# Patient Record
Sex: FEMALE | Race: WHITE | NOT HISPANIC OR LATINO | URBAN - NONMETROPOLITAN AREA
[De-identification: names, ages, dates, MRNs, and addresses within clinical notes are randomized per-mention and may not be internally consistent; named-entity substitution may affect disease eponyms.]

---

## 2019-04-04 ENCOUNTER — IMPORTED ENCOUNTER (OUTPATIENT)
Dept: URBAN - NONMETROPOLITAN AREA CLINIC 1 | Facility: CLINIC | Age: 69
End: 2019-04-04

## 2019-04-04 PROBLEM — H25.813: Noted: 2019-04-04

## 2019-04-04 PROBLEM — H52.4: Noted: 2019-04-04

## 2019-04-04 PROBLEM — H52.03: Noted: 2019-04-04

## 2019-04-04 PROCEDURE — 92015 DETERMINE REFRACTIVE STATE: CPT

## 2019-04-04 PROCEDURE — 99203 OFFICE O/P NEW LOW 30 MIN: CPT

## 2019-04-04 NOTE — PATIENT DISCUSSION
Cataract OU-Reviewed symptoms of advancing cataract growth such as glare and halos and decreased vision.-Continue to monitor for now. Pt will notify us if any new symptoms develop. Hyperopia-Discussed diagnosis with patient. Presbyopia-Discussed diagnosis with patient. Updated spec Rx given. Recommend lens that will provide comfort as well as protect safety and health of eyes. RTC 1 Yr CEE

## 2022-04-09 ASSESSMENT — VISUAL ACUITY
OD_CC: 20/25+
OS_CC: 20/30

## 2022-04-09 ASSESSMENT — TONOMETRY
OS_IOP_MMHG: 13
OD_IOP_MMHG: 13

## 2022-05-17 ENCOUNTER — ESTABLISHED PATIENT (OUTPATIENT)
Dept: RURAL CLINIC 2 | Facility: CLINIC | Age: 72
End: 2022-05-17

## 2022-05-17 DIAGNOSIS — H25.813: ICD-10-CM

## 2022-05-17 DIAGNOSIS — H52.4: ICD-10-CM

## 2022-05-17 DIAGNOSIS — H35.372: ICD-10-CM

## 2022-05-17 DIAGNOSIS — H43.812: ICD-10-CM

## 2022-05-17 PROCEDURE — 92134 CPTRZ OPH DX IMG PST SGM RTA: CPT

## 2022-05-17 PROCEDURE — 99204 OFFICE O/P NEW MOD 45 MIN: CPT

## 2022-05-17 PROCEDURE — 92015 DETERMINE REFRACTIVE STATE: CPT

## 2022-05-17 ASSESSMENT — TONOMETRY
OS_IOP_MMHG: 11
OD_IOP_MMHG: 13

## 2022-05-17 ASSESSMENT — VISUAL ACUITY
OD_SC: 20/20
OS_SC: 20/40+3